# Patient Record
Sex: FEMALE | Race: WHITE | NOT HISPANIC OR LATINO | URBAN - METROPOLITAN AREA
[De-identification: names, ages, dates, MRNs, and addresses within clinical notes are randomized per-mention and may not be internally consistent; named-entity substitution may affect disease eponyms.]

---

## 2020-10-07 ENCOUNTER — OFFICE VISIT (OUTPATIENT)
Dept: URBAN - METROPOLITAN AREA CLINIC 14 | Facility: CLINIC | Age: 75
End: 2020-10-07
Payer: MEDICARE

## 2020-10-07 DIAGNOSIS — H11.042 PERIPHERAL PTERYGIUM, STATIONARY, LEFT EYE: ICD-10-CM

## 2020-10-07 DIAGNOSIS — H26.492 OTHER SECONDARY CATARACT, LEFT EYE: Primary | ICD-10-CM

## 2020-10-07 DIAGNOSIS — A15.9: ICD-10-CM

## 2020-10-07 PROCEDURE — 92014 COMPRE OPH EXAM EST PT 1/>: CPT | Performed by: OPHTHALMOLOGY

## 2020-10-07 ASSESSMENT — INTRAOCULAR PRESSURE
OS: 16
OD: 16

## 2020-10-07 NOTE — IMPRESSION/PLAN
Impression: Peripheral pterygium, stationary, left eye: H11.042. Plan: Stable in appearance. Continue to monitor yearly.

## 2020-10-07 NOTE — IMPRESSION/PLAN
Impression: Other secondary cataract, left eye: H26.492. Plan: Discussed diagnosis in detail with patient. Discussed procedure with pt. VA is still good, do not recommend YAG at this time. Will continue to observe. Do not recommend patient get updated glasses at this time. Patient to use AFT OU PRN.

## 2021-10-06 ENCOUNTER — OFFICE VISIT (OUTPATIENT)
Dept: URBAN - METROPOLITAN AREA CLINIC 15 | Facility: CLINIC | Age: 76
End: 2021-10-06
Payer: MEDICARE

## 2021-10-06 DIAGNOSIS — H26.493 OTHER SECONDARY CATARACT, BILATERAL: Primary | ICD-10-CM

## 2021-10-06 DIAGNOSIS — H02.401 PTOSIS OF RIGHT EYELID: ICD-10-CM

## 2021-10-06 PROCEDURE — 92014 COMPRE OPH EXAM EST PT 1/>: CPT | Performed by: OPHTHALMOLOGY

## 2021-10-06 ASSESSMENT — INTRAOCULAR PRESSURE
OS: 16
OD: 14
OS: 13
OD: 16

## 2021-10-06 NOTE — IMPRESSION/PLAN
Impression: Other secondary cataract, bilateral: H26.493. Plan: Discussed diagnosis in detail with patient. Recommend YAG  Discussed R/B/A of YAG PC, patient voices understanding and elects to proceed. Will proceed with YAG OU same day.

## 2022-10-18 ENCOUNTER — OFFICE VISIT (OUTPATIENT)
Dept: URBAN - METROPOLITAN AREA CLINIC 15 | Facility: CLINIC | Age: 77
End: 2022-10-18
Payer: MEDICARE

## 2022-10-18 DIAGNOSIS — H52.4 PRESBYOPIA: ICD-10-CM

## 2022-10-18 DIAGNOSIS — H02.052 TRICHIASIS WITHOUT ENTROPION RIGHT LOWER EYELID: ICD-10-CM

## 2022-10-18 DIAGNOSIS — H11.002 PTERYGIUM OF LEFT EYE: Primary | ICD-10-CM

## 2022-10-18 PROCEDURE — 67820 REVISE EYELASHES: CPT | Performed by: OPTOMETRIST

## 2022-10-18 PROCEDURE — 99202 OFFICE O/P NEW SF 15 MIN: CPT | Performed by: OPTOMETRIST

## 2022-10-18 ASSESSMENT — VISUAL ACUITY
OS: 20/25
OD: 20/25

## 2022-10-18 ASSESSMENT — INTRAOCULAR PRESSURE
OS: 16
OD: 15

## 2022-10-18 NOTE — IMPRESSION/PLAN
Impression: Pterygium of left eye: H11.002. Plan: Discussed condition and importance of proper ocular surface lubrication and protecting eye from UV light, wind, dust. Instructed patient to use ATs QID/PRN. Patient to call if inflammation or visual changes occur.

## 2022-10-18 NOTE — IMPRESSION/PLAN
Impression: Trichiasis without entropion right lower eyelid: H02.052. Plan: Removed 1 inward turned eyelash inferior lid with sterile forceps. 
Procedure well-tolerated by patient

## 2022-10-19 ENCOUNTER — OFFICE VISIT (OUTPATIENT)
Dept: URBAN - METROPOLITAN AREA CLINIC 15 | Facility: CLINIC | Age: 77
End: 2022-10-19
Payer: MEDICARE

## 2022-10-19 DIAGNOSIS — H02.401 PTOSIS OF RIGHT EYELID: ICD-10-CM

## 2022-10-19 DIAGNOSIS — H26.493 OTHER SECONDARY CATARACT, BILATERAL: Primary | ICD-10-CM

## 2022-10-19 PROCEDURE — 99214 OFFICE O/P EST MOD 30 MIN: CPT | Performed by: OPHTHALMOLOGY

## 2022-10-19 ASSESSMENT — INTRAOCULAR PRESSURE
OD: 14
OD: 15
OS: 14
OS: 15

## 2022-10-19 NOTE — IMPRESSION/PLAN
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 Plan: Discussed diagnosis in detail with patient. Discussed treatment options with patient. No treatment is required at this time. Patient prefers no treatment at this time. Reassured patient of current condition and treatment. Patient instructed to call if condition gets worse.

## 2023-10-25 ENCOUNTER — OFFICE VISIT (OUTPATIENT)
Dept: URBAN - METROPOLITAN AREA CLINIC 15 | Facility: CLINIC | Age: 78
End: 2023-10-25
Payer: MEDICARE

## 2023-10-25 DIAGNOSIS — H26.493 OTHER SECONDARY CATARACT, BILATERAL: Primary | ICD-10-CM

## 2023-10-25 DIAGNOSIS — H43.813 VITREOUS DEGENERATION, BILATERAL: ICD-10-CM

## 2023-10-25 PROCEDURE — 99214 OFFICE O/P EST MOD 30 MIN: CPT | Performed by: OPHTHALMOLOGY

## 2023-10-25 ASSESSMENT — INTRAOCULAR PRESSURE
OS: 11
OS: 16
OD: 16
OD: 11

## 2024-05-16 ENCOUNTER — OFFICE VISIT (OUTPATIENT)
Dept: URBAN - METROPOLITAN AREA CLINIC 52 | Facility: CLINIC | Age: 79
End: 2024-05-16
Payer: MEDICARE

## 2024-05-16 DIAGNOSIS — H26.493 OTHER SECONDARY CATARACT, BILATERAL: Primary | ICD-10-CM

## 2024-05-16 PROCEDURE — 99214 OFFICE O/P EST MOD 30 MIN: CPT | Performed by: OPHTHALMOLOGY

## 2024-05-16 ASSESSMENT — INTRAOCULAR PRESSURE
OS: 16
OS: 11
OD: 12
OD: 16

## 2024-06-18 ENCOUNTER — SURGERY (OUTPATIENT)
Dept: URBAN - METROPOLITAN AREA SURGERY 28 | Facility: LOCATION | Age: 79
End: 2024-06-18
Payer: MEDICARE

## 2024-06-18 ENCOUNTER — SURGERY (OUTPATIENT)
Dept: URBAN - METROPOLITAN AREA SURGERY 29 | Facility: LOCATION | Age: 79
End: 2024-06-18
Payer: MEDICARE

## 2024-06-18 PROCEDURE — 66821 AFTER CATARACT LASER SURGERY: CPT | Performed by: OPHTHALMOLOGY

## 2024-06-26 ENCOUNTER — POST-OPERATIVE VISIT (OUTPATIENT)
Dept: URBAN - METROPOLITAN AREA CLINIC 15 | Facility: CLINIC | Age: 79
End: 2024-06-26
Payer: MEDICARE

## 2024-06-26 DIAGNOSIS — Z48.810 ENCOUNTER FOR SURGICAL AFTERCARE FOLLOWING SURGERY ON A SENSE ORGAN: Primary | ICD-10-CM

## 2024-06-26 PROCEDURE — 99024 POSTOP FOLLOW-UP VISIT: CPT | Performed by: OPHTHALMOLOGY

## 2024-06-26 ASSESSMENT — INTRAOCULAR PRESSURE
OS: 18
OD: 14
OS: 13